# Patient Record
Sex: FEMALE | Race: BLACK OR AFRICAN AMERICAN | Employment: PART TIME
[De-identification: names, ages, dates, MRNs, and addresses within clinical notes are randomized per-mention and may not be internally consistent; named-entity substitution may affect disease eponyms.]

---

## 2022-07-26 ENCOUNTER — TELEPHONE (OUTPATIENT)
Dept: CASE MANAGEMENT | Age: 62
End: 2022-07-26

## 2022-07-26 VITALS — WEIGHT: 170 LBS | HEIGHT: 67 IN | BODY MASS INDEX: 26.68 KG/M2

## 2022-07-26 NOTE — TELEPHONE ENCOUNTER
I called the patient and she confirmed her CT lung screening at [facility] on 7/27/2022 at 8:30 am.  I reminded the patient to arrive at 8:00 am, enter through the main entrance, and register. Patient confirmed.           Electronically signed by Malina Barajas on 7/26/22 at 3:56 PM EDT

## 2022-07-27 ENCOUNTER — HOSPITAL ENCOUNTER (OUTPATIENT)
Dept: CT IMAGING | Age: 62
Discharge: HOME | End: 2022-07-29
Payer: MEDICAID

## 2022-07-27 DIAGNOSIS — Z72.0 TOBACCO ABUSE: ICD-10-CM

## 2022-07-27 DIAGNOSIS — F17.200 TOBACCO USE DISORDER: ICD-10-CM

## 2022-07-27 PROCEDURE — 71271 CT THORAX LUNG CANCER SCR C-: CPT

## 2022-07-28 ENCOUNTER — TELEPHONE (OUTPATIENT)
Dept: CASE MANAGEMENT | Age: 62
End: 2022-07-28

## 2022-07-28 NOTE — TELEPHONE ENCOUNTER
No call, encounter opened to process CT Lung Screening. CT Lung Screen: 7/27/2022    Impression   1. There is no pulmonary infiltrate, mass or suspicious pulmonary nodule. LUNG RADS:   Per ACR Lung-RADS Version 1.1       Category 1, Negative (No nodules and definitely benign nodules). Management: Continue annual lung screening with LDCT in 12 months. RECOMMENDATIONS:   If you would like to register your patient with the [facility], please contact the Nurse Navigator at   [phone]. Pack years: 336.2    Social History     Tobacco Use  Smoking Status: Current Every Day Smoker    Start Date: 0   Quit Date:    Types: Cigarettes   Packs/Day: 8.2   Years: 39   Pack Years: 336.2   Smokeless Tobacco: Never         Results letter sent to patient via my chart or mailed.      [address]